# Patient Record
Sex: MALE | URBAN - METROPOLITAN AREA
[De-identification: names, ages, dates, MRNs, and addresses within clinical notes are randomized per-mention and may not be internally consistent; named-entity substitution may affect disease eponyms.]

---

## 2017-01-13 ENCOUNTER — PATIENT OUTREACH (OUTPATIENT)
Dept: FAMILY MEDICINE CLINIC | Facility: CLINIC | Age: 82
End: 2017-01-13

## 2017-01-13 NOTE — PROGRESS NOTES
Nabeel from [facility] [employer] called to verify patient's chronic condition of diabetes.  I explained that this patient has not yet been seen by Dr. Annita Grossman, and that there is no documentation, and I am unable to confirm if this patient is diabetic or not at this time